# Patient Record
Sex: FEMALE | Race: BLACK OR AFRICAN AMERICAN | NOT HISPANIC OR LATINO
[De-identification: names, ages, dates, MRNs, and addresses within clinical notes are randomized per-mention and may not be internally consistent; named-entity substitution may affect disease eponyms.]

---

## 2017-02-19 ENCOUNTER — HOSPITAL (OUTPATIENT)
Dept: OTHER | Age: 27
End: 2017-02-19
Attending: OBSTETRICS & GYNECOLOGY

## 2017-02-19 LAB
ANALYZER ANC (IANC): ABNORMAL
APPEARANCE UR: CLEAR
APTT PPP: 28 SECONDS (ref 22–30)
APTT PPP: NORMAL S
BASOPHILS # BLD: 0 THOUSAND/MCL (ref 0–0.3)
BASOPHILS NFR BLD: 0 %
BILIRUB UR QL STRIP: NEGATIVE
COLOR UR: YELLOW
DIFFERENTIAL METHOD BLD: ABNORMAL
EOSINOPHIL # BLD: 0.1 THOUSAND/MCL (ref 0.1–0.5)
EOSINOPHIL NFR BLD: 2 %
ERYTHROCYTE [DISTWIDTH] IN BLOOD: 13.1 % (ref 11–15)
FIBRINOGEN RESULT: 375 MG/DL (ref 190–425)
GLUCOSE UR STRIP-MCNC: NEGATIVE MG/DL
HEMATOCRIT: 33.7 % (ref 36–46.5)
HEMOCCULT STL QL: NEGATIVE
HGB BLD-MCNC: 11.4 GM/DL (ref 12–15.5)
INR PPP: 0.9
KETONES UR STRIP-MCNC: NEGATIVE MG/DL
LEUKOCYTE ESTERASE UR QL STRIP: NEGATIVE
LYMPHOCYTES # BLD: 1.9 THOUSAND/MCL (ref 1–4.8)
LYMPHOCYTES NFR BLD: 23 %
MCH RBC QN AUTO: 30.6 PG (ref 26–34)
MCHC RBC AUTO-ENTMCNC: 33.8 GM/DL (ref 32–36.5)
MCV RBC AUTO: 90.6 FL (ref 78–100)
MONOCYTES # BLD: 0.8 THOUSAND/MCL (ref 0.3–0.9)
MONOCYTES NFR BLD: 10 %
NEUTROPHILS # BLD: 5.5 THOUSAND/MCL (ref 1.8–7.7)
NEUTROPHILS NFR BLD: 65 %
NEUTS SEG NFR BLD: ABNORMAL %
NITRITE UR QL STRIP: NEGATIVE
PERCENT NRBC: ABNORMAL
PH UR STRIP: 5.5 UNIT (ref 5–7)
PLATELET # BLD: 224 THOUSAND/MCL (ref 140–450)
PROT UR STRIP-MCNC: NEGATIVE MG/DL
PROTHROMBIN TIME: 10.4 SECONDS (ref 9.7–11.8)
PROTHROMBIN TIME: NORMAL
RBC # BLD: 3.72 MILLION/MCL (ref 4–5.2)
SP GR UR STRIP: >1.03 (ref 1–1.03)
UROBILINOGEN UR STRIP-MCNC: 0.2 MG/DL (ref 0–1)
WBC # BLD: 8.3 THOUSAND/MCL (ref 4.2–11)

## 2017-04-13 ENCOUNTER — HOSPITAL ENCOUNTER (OUTPATIENT)
Dept: HOSPITAL 10 - OBT | Age: 27
Discharge: HOME | End: 2017-04-13
Attending: SPECIALIST
Payer: COMMERCIAL

## 2017-04-13 VITALS — SYSTOLIC BLOOD PRESSURE: 122 MMHG | DIASTOLIC BLOOD PRESSURE: 67 MMHG | RESPIRATION RATE: 18 BRPM | HEART RATE: 91 BPM

## 2017-04-13 VITALS
HEIGHT: 64 IN | WEIGHT: 247.58 LBS | WEIGHT: 247.58 LBS | BODY MASS INDEX: 42.27 KG/M2 | HEIGHT: 64 IN | BODY MASS INDEX: 42.27 KG/M2

## 2017-04-13 DIAGNOSIS — Z3A.29: ICD-10-CM

## 2017-04-13 DIAGNOSIS — J00: ICD-10-CM

## 2017-04-13 DIAGNOSIS — O26.893: ICD-10-CM

## 2017-04-13 LAB
ADD SCAN DIFF: NO
BASOPHILS # BLD AUTO: 0 10^3/UL (ref 0–0.1)
BASOPHILS NFR BLD: 0.1 % (ref 0–2)
EOSINOPHIL # BLD: 0.1 10^3/UL (ref 0–0.5)
EOSINOPHIL NFR BLD: 1.4 % (ref 0–7)
ERYTHROCYTE [DISTWIDTH] IN BLOOD BY AUTOMATED COUNT: 12.9 % (ref 11.5–14.5)
HCT VFR BLD CALC: 35.8 % (ref 37–47)
HGB BLD-MCNC: 12 G/DL (ref 12–16)
LYMPHOCYTES # BLD AUTO: 1.9 10^3/UL (ref 0.8–2.9)
LYMPHOCYTES NFR BLD AUTO: 24.7 % (ref 15–51)
MCH RBC QN AUTO: 30.9 PG (ref 29–33)
MCHC RBC AUTO-ENTMCNC: 33.5 G/DL (ref 32–37)
MCV RBC AUTO: 92.3 FL (ref 82–101)
MONOCYTES # BLD: 0.7 10^3/UL (ref 0.3–0.9)
MONOCYTES NFR BLD: 9.5 % (ref 0–11)
NEUTROPHILS # BLD: 4.8 10^3/UL (ref 1.6–7.5)
NEUTROPHILS NFR BLD AUTO: 62.5 % (ref 39–77)
NRBC # BLD MANUAL: 0 10^3/UL (ref 0–0)
NRBC BLD QL: 0 /100WBC (ref 0–0)
PLATELET # BLD: 279 10^3/UL (ref 140–415)
PMV BLD AUTO: 9.7 FL (ref 7.4–10.4)
RBC # BLD AUTO: 3.88 10^6/UL (ref 4.2–5.4)
WBC # BLD AUTO: 7.6 10^3/UL (ref 4.8–10.8)

## 2017-04-13 PROCEDURE — 85025 COMPLETE CBC W/AUTO DIFF WBC: CPT

## 2017-04-13 PROCEDURE — 86901 BLOOD TYPING SEROLOGIC RH(D): CPT

## 2017-04-13 PROCEDURE — 82947 ASSAY GLUCOSE BLOOD QUANT: CPT

## 2017-04-13 PROCEDURE — 86900 BLOOD TYPING SEROLOGIC ABO: CPT

## 2017-04-13 PROCEDURE — G0463 HOSPITAL OUTPT CLINIC VISIT: HCPCS

## 2017-04-13 PROCEDURE — 36415 COLL VENOUS BLD VENIPUNCTURE: CPT

## 2017-04-13 PROCEDURE — 87340 HEPATITIS B SURFACE AG IA: CPT

## 2017-04-13 PROCEDURE — 86592 SYPHILIS TEST NON-TREP QUAL: CPT

## 2017-04-13 PROCEDURE — 86762 RUBELLA ANTIBODY: CPT

## 2017-04-13 PROCEDURE — 86703 HIV-1/HIV-2 1 RESULT ANTBDY: CPT

## 2017-04-13 PROCEDURE — 76818 FETAL BIOPHYS PROFILE W/NST: CPT

## 2017-04-13 NOTE — RADRPT
PROCEDURE:   US OB biophysical profile. 

 

CLINICAL INDICATION:    decreased fetal movements, contractions 

 

TECHNIQUE:   Multiple sonographic images of the pelvis were obtained.  The images were reviewed on a
 PACS workstation. 

 

COMPARISON:   No prior studies are available for comparison. 

 

FINDINGS:

 

There is a single viable intrauterine gestation.  Cardiac activity is present with 148 beats per min
james. 

There is a vertex presentation. 

The placenta is posterior.   There is no evidence of placental abruption.

There is a normal amount of amniotic fluid with an ELIZABETH = 11.2 cm.

 

Biophysical profile:

Fetal movement 2/2

Fetal tone 2/2.

Fetal breathing 2/2 

ELIZABETH 2/2

 

Total 8/8 

 

RPTAT: AA . 

 

IMPRESSION:

Normal biophysical profile.  

 .

_____________________________________________ 

.Osei Ybarra MD, MD           Date    Time 

Electronically viewed and signed by .Osei Ybarra MD, MD on 04/13/2017 14:01 

 

D:  04/13/2017 14:01  T:  04/13/2017 14:01

.S/

## 2017-04-13 NOTE — CONS
Date/Time of Note


Date/Time of Note


DATE: 17 


TIME: 16:50





Consultation Date/Type/Reason


Admit Date/Time


2017


OB triage consult


Reason for Consultation


This patient is a 18 years old  3 para 1  1 with EDC of 2017 which makes her 29 weeks and 03 .


This patient had a history of preeclampsia and underwent a primary   

section at 36 weeks gestation .


According to patient due to this history her obstetrician advised her to 

[facility]


another reason for not being followed by previous Ob was the change of her 

insurance policy. 


She is also complaining of contractions and cold symptoms .


On examination she is a well-developed well-nourished -American lady in 

no acute distress


Her ear nose throat appears to be normal neck is normal no neck vein distention 

no thyromegaly no lymph node enlargement


On auscultation of the chest she does have few rales both lung base. 


 Heart is normal sinus rhythm no murmur 


Abdomen is soft. She is having very rare contractions . 


Normal fetal movement fetal heart rate is normal with good variability and 

occasional acceleration no deceleration


No CVA tenderness as I mentioned abdomen is generally soft


Fetus appears to be in vertex presentation


On pelvic examination she has fairly severe candidal vaginitis  with cheesy 

vaginal discharge.Cervix is closed long no bleeding


Lower extremities normal her knee-jerk reflexes 1+ no edema no varicosities





Due to the fact that she did not have any  pregnancy related  workup so far lab 

and ultrasound study was ordered.  


Her blood type is O Rh+ serum glucose level 81.  Her CBC was basically normal 

except for slight anemia hemoglobin 12 hematocrit 35.8 WBC 7.6 and platelet of 

279,000


On ultrasound study a single viable intrauterine gestation with cardiac 

activity 148 bpm in vertex presentation report placenta was posterior and no 

evidence of placental previa or abruption.


 amniotic fluid index was reported 11.2 cm biophysical profile 


Constitutional:  No chills, No diaphoresis, No disoriented, No febrile, No 

improved, No no complaints, No other, No poor po, No requiring IVF, No 

requiring O2


Eyes:  No discharge, No no complaints, No other, No pain, No redness, No visual 

change


ENT:  No bleeding, No congestion, No discharge, No dysphagia, No no complaints, 

No other, No pain, No sore throat


Respiratory:  other (On listening to the lungs she does have a few rales), 


   No cough, No no complaints, No pain, No pleuritic pain, No shortness of 

breath, No sputum, No wheezing


Cardiovascular:  other (Heart normal sinus rhythm no murmur), 


   No chest pain, No edema, No lightheadedness, No no complaints, No orthopenea

, No palpitations, No paroxysmal nocturnal dyspnea


Gastrointestinal:  No blood, No constipation, No decreased appetite, No diarrhea

, No flatus, No nausea, No no complaints, No other, No pain, No passing stool, 

No vomiting


Genitourinary:  other (Fairly severe candidal type discharge on examination 

cervix was closed and thick as I mentioned before), 


   No bleeding, No discharge, No dysuria, No flank pain, No hematuria, No no 

complaints


Musculoskeletal:  No back pain, No bone/joint pain, No neck pain, No no 

complaints, No other, No restricted range of motion, No swelling


Skin:  No bruising, No erythema, No laceration, No no complaints, No other, No 

pruritis, No rash, No skin lesions


Neurologic:  other (Knee-jerk reflex was 1+), 


   No confusion, No dizziness, No focal-weakness, No headache, No no complaints

, No seizure, No syncope


Lymphatic:  No adenopathy, No lymphadema, No no complaints, No other, No tender 

nodes


Psychological:  No anxiety, No confusion, No depression, No nl mood/affect, No 

no complaints, No other, No suicidal


Additional Comments


Her complete blood and ultrasound studies were negative


Patient was advised to call her insurance and obtain the name of of a 

contracted  obstetrician and continue her prenatal care including timely lab 

works. She is 29 weeks ad need to have GTT.





 Laboratory Tests








Test


  17


12:00


 


White Blood Count 7.610^3/ul 


 


Red Blood Count 3.8810^6/ul 


 


Hemoglobin 12.0g/dl 


 


Hematocrit 35.8% 


 


Mean Corpuscular Volume 92.3fl 


 


Mean Corpuscular Hemoglobin 30.9pg 


 


Mean Corpuscular Hemoglobin


Concent 33.5g/dl 


 


 


Red Cell Distribution Width 12.9% 


 


Platelet Count 15426^3/UL 


 


Mean Platelet Volume 9.7fl 


 


Neutrophils % 62.5% 


 


Lymphocytes % 24.7% 


 


Monocytes % 9.5% 


 


Eosinophils % 1.4% 


 


Basophils % 0.1% 


 


Nucleated Red Blood Cells % 0.0/100WBC 


 


Neutrophils # 4.810^3/ul 


 


Lymphocytes # 1.910^3/ul 


 


Monocytes # 0.710^3/ul 


 


Eosinophils # 0.110^3/ul 


 


Basophils # 0.010^3/ul 


 


Nucleated Red Blood Cells # 0.010^3/ul 


 


Glucose Level 81mg/dl 


 


Hepatitis B Surface Antigen NEGATIVE 


 


HIV (1&2) Antibody NEGATIVE 











Social History


Smoking Status:  Never smoker





Exam/Review of Systems


Vital Signs


Vitals





 Vital Signs








  Date Time  Temp Pulse Resp B/P Pulse Ox O2 Delivery O2 Flow Rate FiO2


 


17 11:14 97.8 91 18 122/67  Room Air  











Results


Result Diagram:  


17 1200                                                                   

             17 1200





Results 24 hrs





Laboratory Tests








Test


  17


12:00


 


White Blood Count 7.6  


 


Red Blood Count 3.88  L


 


Hemoglobin 12.0  


 


Hematocrit 35.8  L


 


Mean Corpuscular Volume 92.3  


 


Mean Corpuscular Hemoglobin 30.9  


 


Mean Corpuscular Hemoglobin


Concent 33.5  


 


 


Red Cell Distribution Width 12.9  


 


Platelet Count 279  


 


Mean Platelet Volume 9.7  


 


Neutrophils % 62.5  


 


Lymphocytes % 24.7  


 


Monocytes % 9.5  


 


Eosinophils % 1.4  


 


Basophils % 0.1  


 


Nucleated Red Blood Cells % 0.0  


 


Neutrophils # 4.8  


 


Lymphocytes # 1.9  


 


Monocytes # 0.7  


 


Eosinophils # 0.1  


 


Basophils # 0.0  


 


Nucleated Red Blood Cells # 0.0  


 


Glucose Level 81  


 


Hepatitis B Surface Antigen NEGATIVE  


 


HIV (1&2) Antibody NEGATIVE  

















RUEL LAUREANO MD 2017 16:59

## 2017-04-15 LAB — RUBV IGG SERPL IA-ACNC: 1.89 INDEX
